# Patient Record
Sex: MALE | Race: BLACK OR AFRICAN AMERICAN | NOT HISPANIC OR LATINO | URBAN - METROPOLITAN AREA
[De-identification: names, ages, dates, MRNs, and addresses within clinical notes are randomized per-mention and may not be internally consistent; named-entity substitution may affect disease eponyms.]

---

## 2023-07-10 ENCOUNTER — TELEPHONE (OUTPATIENT)
Dept: DIABETES | Facility: CLINIC | Age: 68
End: 2023-07-10

## 2023-07-10 NOTE — TELEPHONE ENCOUNTER
Referral received from the [facility] Called the clinic to let them know that at this time we aren't taking new medicaid patients   Patient doesn't have the people's health insurance at this time

## 2024-05-08 ENCOUNTER — HOSPITAL ENCOUNTER (EMERGENCY)
Facility: HOSPITAL | Age: 69
Discharge: HOME | End: 2024-05-09
Attending: STUDENT IN AN ORGANIZED HEALTH CARE EDUCATION/TRAINING PROGRAM
Payer: MEDICARE

## 2024-05-08 VITALS
DIASTOLIC BLOOD PRESSURE: 75 MMHG | WEIGHT: 134 LBS | HEART RATE: 70 BPM | OXYGEN SATURATION: 98 % | RESPIRATION RATE: 14 BRPM | SYSTOLIC BLOOD PRESSURE: 144 MMHG

## 2024-05-08 DIAGNOSIS — S00.03XA CONTUSION OF SCALP, INITIAL ENCOUNTER: Primary | ICD-10-CM

## 2024-05-08 DIAGNOSIS — S40.019A SHOULDER CONTUSION: ICD-10-CM
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
## 2024-05-08 PROCEDURE — 99285 EMERGENCY DEPT VISIT HI MDM: CPT

## 2024-05-08 RX ORDER — ACETAMINOPHEN 500 MG
1000 TABLET ORAL
Status: COMPLETED | OUTPATIENT
Start: 2024-05-09 | End: 2024-05-09

## 2024-05-09 PROCEDURE — 25000003 PHARM REV CODE 250: Performed by: STUDENT IN AN ORGANIZED HEALTH CARE EDUCATION/TRAINING PROGRAM

## 2024-05-09 RX ADMIN — ACETAMINOPHEN 1000 MG: 500 TABLET ORAL at 12:05

## 2024-05-09 NOTE — DISCHARGE INSTRUCTIONS
Follow-up with your primary care doctor by calling for appointment.  Return to the emergency department if condition worsens in any way.

## 2024-05-09 NOTE — ED PROVIDER NOTES
Encounter Date: 5/8/2024  S/p glf     History     Chief Complaint   Patient presents with    Fall     Patient visiting wife on 4th floor. Went to walk to bathroom and fell and hit back of head on floor. Patient denies LOC. Denies blood thinners     HPI  68-year-old male presents brought in by a self after ground level fall while visiting with his wife who is here in the hospital.  He reports that he was walking into the bathroom and lost balance, falling backwards and hitting head, no LOC. complaints of discomfort in the right shoulder as well which he landed.  denies anticoagulants or antiplatelets.  Denies any other systemic or infectious symptoms or other acute complaints.    Review of patient's allergies indicates:  No Known Allergies  No past medical history on file.  No past surgical history on file.  No family history on file.     Review of Systems  Complete review of systems was conducted and was negative except as per HPI and as marked    Physical Exam     Initial Vitals [05/08/24 2342]   BP Pulse Resp Temp SpO2   (!) 144/75 70 14 -- 98 %      MAP       --         Physical Exam  Physical  General: Awake, alert, no acute distress  Head:  Small posterior scalp contusion this lara sign her nasal septal hematoma.    Neck: Trachea midline, full range of motion, no meningismus  ENT: Atraumatic, Airway Patent  Cardio: Regular Rate, Regular Rhythm, Heart Sounds Normal, Capillary refill normal  Chest: Atraumatic, No respiratory distress no use of accessory muscles to breath, normal rate, sounds even and clear to auscultation  Abdomen: Soft, Nontender, no involuntary guarding, rigidity, or rebound.  Upper Ext: Atraumatic, Inspection normal, no swelling  Lower Ext: Atraumatic, Inspection normal, no swelling  Neuro: No gross cranial nerve abnormality, no lateralization, no gross sensory or motor deficits  Abdomen: Soft, Nontender, no involuntary guarding, rigidity, or rebound.  ED Course   Procedures  Labs Reviewed -  No data to display       Imaging Results              CT Cervical Spine Without Contrast (Final result)  Result time 05/09/24 01:32:50      Final result by Frannie Sher MD (05/09/24 01:32:50)                   Impression:      No evidence of acute fracture or subluxation of cervical spine.    There is multilevel spondylosis and stenosis as detailed above at each disc level.      Electronically signed by: Frannie Sher  Date:    05/09/2024  Time:    01:32               Narrative:    EXAMINATION:  CT CERVICAL SPINE WITHOUT CONTRAST    CLINICAL HISTORY:  polytrauma, blunt;    TECHNIQUE:  Low dose axial images, sagittal and coronal reformations were performed though the cervical spine.  Contrast was not administered.    COMPARISON:  None    FINDINGS:  Is there is no evidence of acute fracture.  Sagittal reformatted images demonstrate adequate alignment of the cervical spine.  Craniocervical junction is aligned.  There is no suspicious lytic or sclerotic osseous lesion.    C2-C3:There is a small dorsal disc protrusion.  No significant canal or neural foraminal stenosis seen.    C3-C4:Moderate central disc protrusion contributes to mild canal stenosis with ventral cord contact.  Facet and uncovertebral joint hypertrophic changes on the left contribute to moderate left neural foraminal stenosis.    C4-C5:There is moderate central disc protrusion contributing to mild canal stenosis with contact of the ventral cervical spinal cord.  Uncovertebral joint facet hypertrophic changes contribute to mild right and moderate left neural foraminal stenosis.    C5-C6:There is spondylosis with loss of disc height and dorsal small disc protrusion.  There is no significant canal stenosis.  Uncovertebral joint and facet hypertrophic changes contribute to bilateral moderate neural foraminal stenosis greater on the left.    C6-C7:There is small dorsal disc protrusion without is significant canal stenosis.  Uncovertebral joint facet  hypertrophy contributes to moderate left neural foraminal stenosis and mild right neural foraminal stenosis.    C7-T1:There is no bony canal or neural foraminal stenosis. There is no significant neural foraminal stenosis.    The soft tissue structures visualized in the neck are unremarkable.    The pharynx and imaged laryngeal structures is are unremarkable.  Vascular calcifications noted in the carotid arteries bilaterally..                                       CT Head Without Contrast (Final result)  Result time 05/09/24 01:25:12      Final result by Frannie Sher MD (05/09/24 01:25:12)                   Impression:      No evidence of acute intracranial abnormality or fracture.    Minor deep white matter low density as seen with microvascular chronic ischemic changes.    Senescent atrophic changes.      Electronically signed by: Frannie Sher  Date:    05/09/2024  Time:    01:25               Narrative:    EXAMINATION:  CT HEAD WITHOUT CONTRAST    CLINICAL HISTORY:  Polytrauma, blunt;    TECHNIQUE:  Low dose axial images were obtained through the head.  Coronal and sagittal reformations were also performed. Contrast was not administered.    COMPARISON:  None.    FINDINGS:  There is no acute intra or extra-axial hemorrhage or hematoma.  Gray-white matter junction differentiation is intact with no evidence of acute major arterial infarct.  There is no appreciable focal mass or mass effect.  There is prominence of the ventricles, cisterns and sulci as seen with senescent atrophic changes consistent with age.  There is mild patchy low density in the periventricular deep white matter as seen with microvascular chronic ischemic changes.  The visualized paranasal sinuses, mastoid air cells and middle ears appear clear.  Orbital structures appear intact and symmetric as imaged.  Posterior fossa structures and pituitary gland are unremarkable as imaged.  Vascular calcifications noted in the intracranial internal  carotid arteries.  There is no bony calvarial fracture.                                       X-Ray Shoulder Trauma Right (Final result)  Result time 05/09/24 01:17:50      Final result by Frannie Sher MD (05/09/24 01:17:50)                   Impression:      No appreciable acute abnormality involving the right shoulder.    Degenerative changes and osteopenia as described.      Electronically signed by: Frannie Sher  Date:    05/09/2024  Time:    01:17               Narrative:    EXAMINATION:  XR SHOULDER TRAUMA 3 VIEW RIGHT    CLINICAL HISTORY:  Contusion of unspecified shoulder, initial encounter    TECHNIQUE:  Three or four views of the right shoulder were performed.    COMPARISON:  None    FINDINGS:  Severe acromioclavicular joint degenerative changes are noted.  There is no appreciable acute fracture or dislocation noting osteopenia limits detection for nondisplaced fractures.  There are degenerative changes at the glenohumeral joint.  There is no dislocation.  Visualized right ribs and right lung are unremarkable.                        Wet Read by Horacio Harding MD (05/09/24 00:30:52, [facility] Emergency Dept, Emergency Medicine)    OA. No acute process                                     Medications   acetaminophen tablet 1,000 mg (1,000 mg Oral Given 5/9/24 0013)     Medical Decision Making  Amount and/or Complexity of Data Reviewed  Radiology: ordered and independent interpretation performed.    Risk  OTC drugs.    68-year-old male status post mechanical ground level fall, nonsyncopal.  Injury to head, neck, and right shoulder.  Patient is hemodynamically stable and neurologically nonfocal.  Will evaluate for acute pathology requiring intervention with appropriate studies, treat symptomatically, and reassess.  Unable to risk stratify need for CT imaging of head by any scoring criteria due to age greater than 65, and unable to risk stratify out need for CT imaging of neck by any scoring criteria  due to inferior lid 63, for which nexus is not valid.  As such, we proceed with imaging of both of these.  Additionally patient failed nexus even regardless of age due to midline neck pain/tenderness.  Will evaluate for acute pathology requiring intervention with appropriate studies, treat symptomatically, and reassess.    All studies reviewed imaging personally reviewed by me, negative.  All studies reviewed, negative for acute pathology requiring intervention. Diagnosis, use of prescription medications, need for follow-up regarding visit today, need to call for follow-up, expected course, and return precautions were all discussed at length in my traditional manner to which patient verbalized understanding and agrees and all questions were answered. Patient is well appearing and ambulatory at time of discharge.                                  Clinical Impression:  Final diagnoses:  [S40.019A] Shoulder contusion  [W19.XXXA] Fall, initial encounter  [S00.03XA] Contusion of scalp, initial encounter (Primary)          ED Disposition Condition    Discharge Stable          ED Prescriptions    None       Follow-up Information    None          Horacio Harding MD  05/09/24 [id number]